# Patient Record
Sex: MALE | Race: OTHER
[De-identification: names, ages, dates, MRNs, and addresses within clinical notes are randomized per-mention and may not be internally consistent; named-entity substitution may affect disease eponyms.]

---

## 2017-09-09 ENCOUNTER — HOSPITAL ENCOUNTER (EMERGENCY)
Dept: HOSPITAL 61 - ER | Age: 37
Discharge: HOME | End: 2017-09-09
Payer: SELF-PAY

## 2017-09-09 VITALS — WEIGHT: 232 LBS | HEIGHT: 66 IN | BODY MASS INDEX: 37.28 KG/M2

## 2017-09-09 VITALS — DIASTOLIC BLOOD PRESSURE: 78 MMHG | SYSTOLIC BLOOD PRESSURE: 138 MMHG

## 2017-09-09 DIAGNOSIS — L23.7: ICD-10-CM

## 2017-09-09 DIAGNOSIS — L03.115: Primary | ICD-10-CM

## 2017-09-09 PROCEDURE — 90715 TDAP VACCINE 7 YRS/> IM: CPT

## 2017-09-09 PROCEDURE — 93971 EXTREMITY STUDY: CPT

## 2017-09-09 PROCEDURE — 99284 EMERGENCY DEPT VISIT MOD MDM: CPT

## 2017-09-09 PROCEDURE — 96372 THER/PROPH/DIAG INJ SC/IM: CPT

## 2017-09-09 PROCEDURE — 90471 IMMUNIZATION ADMIN: CPT

## 2017-09-09 NOTE — RAD
Right lower extremity venous ultrasound, 9/9/2017



History: Leg pain and cellulitis



Duplex evaluation including grayscale, color flow and spectral Doppler

analysis was performed.  The femoral and popliteal veins show no filling

defects to suggest DVT.   The visualized deep veins in the right calf are

unremarkable.



There is a mildly enlarged 3.1 x 1.6 cm lymph node at the right groin. This is

probably on a reactive basis.





IMPRESSION:   There is no sonographic evidence of deep vein thrombosis in the

right lower extremity

## 2017-09-09 NOTE — PHYS DOC
Past Medical History


Past Medical History:  No Pertinent History


Past Surgical History:  Other


Additional Past Surgical Histo:  L LEG SX, BACK S


Alcohol Use:  None


Drug Use:  None





Adult General


Chief Complaint


Chief Complaint:  LOWER EXT PAIN





HPI


HPI





Patient is a 37  year old male with no significant medical history who presents 

with an area of redness and swelling to the right lower extremity that he noted 

3 days ago. Patient denies any known cause for this redness. Patient states his 

had subjective fevers.





Review of Systems


Review of Systems





Constitutional: Subjective fevers


Musculoskeletal: Redness and swelling to the right lower extremity


Integument: Denies rash or skin lesions []


Neurologic: Denies headache, focal weakness or sensory changes []





Current Medications


Current Medications





Current Medications








 Medications


  (Trade)  Dose


 Ordered  Sig/Rodolfo  Start Time


 Stop Time Status Last Admin


Dose Admin


 


 Ceftriaxone Sodium


  (Rocephin Im)  1 gm  1X  ONCE  9/9/17 11:00


 9/9/17 11:01 DC 9/9/17 11:44


1 GM


 


 Clindamycin HCl


  (Cleocin)  450 mg  1X  ONCE  9/9/17 11:00


 9/9/17 11:01 DC 9/9/17 11:44


450 MG


 


 Diphtheria/


 Tetanus/Acell


 Pertussis


  (Boostrix)  0.5 ml  ONCE ONCE  9/9/17 11:00


 9/9/17 11:01 DC 9/9/17 11:46


0.5 ML


 


 Lidocaine HCl


  (Xylocaine-Mpf


 1% Vial)  2 ml  1X  ONCE  9/9/17 11:00


 9/9/17 11:01 DC 9/9/17 11:44


2 ML











Allergies


Allergies





Allergies








Coded Allergies Type Severity Reaction Last Updated Verified


 


  No Known Drug Allergies    9/9/17 No











Physical Exam


Physical Exam





Constitutional: Well developed, well nourished, no acute distress, non-toxic 

appearance. []


Skin: Patient has mild cellulitis to anterior distal shin, there is +1 edema to 

the right lower extremity. There is warmth over the cellulitis area. There is 

no fluctuance to the area. Negative Homans sign to the right lower extremity


Back: No tenderness, no CVA tenderness. [] 


Extremities: No tenderness, no cyanosis, no clubbing, ROM intact, no edema. [] 


Neurologic: Alert and oriented X 3, normal motor function, normal sensory 

function, no focal deficits noted. []


Psychologic: Affect normal, judgement normal, mood normal. []





Current Patient Data


Vital Signs





 Vital Signs








  Date Time  Temp Pulse Resp B/P (MAP) Pulse Ox O2 Delivery O2 Flow Rate FiO2


 


9/9/17 10:35 98.4 110 20  98 Room Air  





 98.4       











EKG


EKG


[]





Radiology/Procedures


Radiology/Procedures


[]PROCEDURE: VENOUS LOWER EXTREMITY RIGHT








Right lower extremity venous ultrasound, 9/9/2017





History: Leg pain and cellulitis





Duplex evaluation including grayscale, color flow and spectral Doppler


analysis was performed.  The femoral and popliteal veins show no filling


defects to suggest DVT.   The visualized deep veins in the right calf are


unremarkable.





There is a mildly enlarged 3.1 x 1.6 cm lymph node at the right groin. This is


probably on a reactive basis.








IMPRESSION:   There is no sonographic evidence of deep vein thrombosis in the


right lower extremity














DICTATED and SIGNED BY:     MORITZ,RICK S MD


DATE:     09/09/17 1133





CC: MORA PICKARD; NO PCP ~





Course & Med Decision Making


Course & Med Decision Making


Pertinent Labs and Imaging studies reviewed. (See chart for details)





Patient is in the ED with cellulitis of the right shin. He was given tetanus in 

the ED tetanus, clindamycin and Rocephin. Venous Doppler of the right lower 

extremity was done to rule out DVT. Doppler was negative.





Patient was discharged with clindamycin for 10 days. Upon discharge patient 

states he believes the cellulitis could've come from poison ivy. Gave him a 

prescription for prednisone as well as instructions to take over-the-counter 

Benadryl. Instructed to follow-up with his own PCP in one to 2 weeks. Provided 

return precautions and discharged in stable condition.





Dragon Disclaimer


Dragon Disclaimer


This electronic medical record was generated, in whole or in part, using a 

voice recognition dictation system.





Departure


Departure


Impression:  


 Primary Impression:  


 Cellulitis of leg, right


 Additional Impression:  


 Contact dermatitis due to poison ivy


Disposition:  01 HOME, SELF-CARE


Condition:  STABLE


Patient Instructions:  Cellulitis, Easy-to-Read, Poison Ivy, Easy-to-Read





Additional Instructions:  


You were seen with cellulitis of the right lower extremity. This can be caused 

by many things including a rash from poison ivy. You must complete your 

antibiotics. Please follow-up with your doctor in 1-2 weeks. Try to elevate the 

affected extremity. Come back to the emergency room at any point symptoms 

worsen.


Scripts


Prednisone (PREDNISONE) 50 Mg Tablet


1 TAB PO DAILY, #5 TAB


   Prov: MORA PICKARD         9/9/17 


Clindamycin Hcl (CLINDAMYCIN HCL) 150 Mg Capsule


3 CAP PO TID, #90 CAP


   Prov: MORA PICKARD         9/9/17





Problem Qualifiers











MORA PICKARD Sep 9, 2017 10:54

## 2019-06-15 ENCOUNTER — HOSPITAL ENCOUNTER (EMERGENCY)
Dept: HOSPITAL 35 - ER | Age: 39
Discharge: HOME | End: 2019-06-15
Payer: COMMERCIAL

## 2019-06-15 VITALS — WEIGHT: 242 LBS | BODY MASS INDEX: 40.32 KG/M2 | HEIGHT: 65 IN

## 2019-06-15 VITALS — SYSTOLIC BLOOD PRESSURE: 122 MMHG | DIASTOLIC BLOOD PRESSURE: 76 MMHG

## 2019-06-15 DIAGNOSIS — G44.209: Primary | ICD-10-CM
